# Patient Record
Sex: FEMALE | Race: ASIAN | NOT HISPANIC OR LATINO | Employment: STUDENT | URBAN - METROPOLITAN AREA
[De-identification: names, ages, dates, MRNs, and addresses within clinical notes are randomized per-mention and may not be internally consistent; named-entity substitution may affect disease eponyms.]

---
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
## 2021-03-03 ENCOUNTER — COMMUNICATION - HEALTHEAST (OUTPATIENT)
Dept: FAMILY MEDICINE | Facility: CLINIC | Age: 21
End: 2021-03-03

## 2021-03-03 DIAGNOSIS — N92.1 MENORRHAGIA WITH IRREGULAR CYCLE: ICD-10-CM

## 2021-04-23 ENCOUNTER — OFFICE VISIT - HEALTHEAST (OUTPATIENT)
Dept: FAMILY MEDICINE | Facility: CLINIC | Age: 21
End: 2021-04-23

## 2021-04-23 DIAGNOSIS — N92.1 MENORRHAGIA WITH IRREGULAR CYCLE: ICD-10-CM

## 2021-04-23 DIAGNOSIS — D50.0 IRON DEFICIENCY ANEMIA DUE TO CHRONIC BLOOD LOSS: ICD-10-CM

## 2021-04-23 DIAGNOSIS — Z23 NEED FOR VACCINATION: ICD-10-CM

## 2021-04-23 DIAGNOSIS — Z30.09 ENCOUNTER FOR COUNSELING REGARDING CONTRACEPTION: ICD-10-CM

## 2021-04-23 LAB
ERYTHROCYTE [DISTWIDTH] IN BLOOD BY AUTOMATED COUNT: 11.9 % (ref 11–14.5)
FERRITIN SERPL-MCNC: 36 NG/ML (ref 10–130)
HCT VFR BLD AUTO: 39.8 % (ref 35–47)
HGB BLD-MCNC: 12.4 G/DL (ref 12–16)
HIV 1+2 AB+HIV1 P24 AG SERPL QL IA: NEGATIVE
MCH RBC QN AUTO: 27.4 PG (ref 27–34)
MCHC RBC AUTO-ENTMCNC: 31.2 G/DL (ref 32–36)
MCV RBC AUTO: 88 FL (ref 80–100)
PLATELET # BLD AUTO: 228 THOU/UL (ref 140–440)
PMV BLD AUTO: 11.5 FL (ref 8.5–12.5)
RBC # BLD AUTO: 4.53 MILL/UL (ref 3.8–5.4)
WBC: 6.7 THOU/UL (ref 4–11)

## 2021-04-23 RX ORDER — NORGESTIMATE AND ETHINYL ESTRADIOL 0.25-0.035
1 KIT ORAL DAILY
Qty: 3 PACKAGE | Refills: 4 | Status: SHIPPED | OUTPATIENT
Start: 2021-04-23 | End: 2022-07-25

## 2021-04-26 LAB — HCV AB SERPL QL IA: NEGATIVE

## 2021-04-27 LAB
C TRACH DNA SPEC QL PROBE+SIG AMP: NEGATIVE
N GONORRHOEA DNA SPEC QL NAA+PROBE: NEGATIVE

## 2021-05-26 ENCOUNTER — COMMUNICATION - HEALTHEAST (OUTPATIENT)
Dept: FAMILY MEDICINE | Facility: CLINIC | Age: 21
End: 2021-05-26

## 2021-05-26 DIAGNOSIS — N92.1 MENORRHAGIA WITH IRREGULAR CYCLE: ICD-10-CM

## 2021-05-26 ASSESSMENT — PATIENT HEALTH QUESTIONNAIRE - PHQ9: SUM OF ALL RESPONSES TO PHQ QUESTIONS 1-9: 2

## 2021-05-27 NOTE — PROGRESS NOTES
Assessment/plan   Gualberto Murillo is a 19 y.o. female who is new to my practice.    Gualberto was seen today for menstrual problem.    Diagnoses and all orders for this visit:    Menorrhagia (heavy menstrual bleeding) with regular cycle. Heavy bleeding for 2 days each cycle, leading to presumed iron deficiency anemia based on labs from 2016. Declines  exam today, making full assessment challenging.   Fairly regular cycle each month making anovulatory bleeding less likely cause for her heavy menses. No excessive pain/dysmenorrhea.     Ddx for her abnormal uterine bleeding would include polyp, leiomyoma, adenomyoma, thyroid disorder, bleeding disorder. Unlikely to be infection/cervicitis given no h/o sexual contacts. Less likely malignancy/hyperplasia given age.     Given concern for mucocutaneous bleeding with unknown family hx, will start with lab evaluation as follows. Consideration for pelvic ultrasound; family defers at this time. We will start with stronger NSAID, and if improvement in reduction in blood flow after 1-2 cycles, she will likely continue with this. Otherwise, I did review options for hormonal control of her heavy bleeding. Reviewed range of contraceptive options (though not yet sexually active), from OCPs, the patch, Depo-provera, to hormonal vs nonhormonal IUD and nexplanon, as well as various options for natural family planning or surgical. Reviewed typical effectiveness of each as well as side effect profiles of these options, including effects on spotting, nausea/ headaches, and emotional lability. Pt was given materials to read and will discuss further with her mother.   -     naproxen (NAPROSYN) 500 MG tablet; Take 1 tablet (500 mg total) by mouth 2 (two) times a day with meals.  -     HM1(CBC and Differential)  -     TSH  -     Iron and Transferrin Iron Binding Capacity  -     Ferritin  -     APTT(PTT)  -     INR  -     HM1 (CBC with Diff)  -     Von Willebrand Factor Activity (VWF:Act)  -      Von Willebrand Antigen  -     Factor 8 Assay  Addendum: see result note, labs c/w moderate iron deficiency anemia; recommend replacement with PO iron and recheck CBC  & ferritin in 3 months.      Epistaxis, easy bruising, and uterine bleeding. Given concern for mucocutaneous bleeding with unknown family hx, will start with lab evaluation as follows:  -     APTT(PTT)  -     INR  -     Von Willebrand Factor Activity (VWF:Act)  -     Von Willebrand Antigen  -     Factor 8 Assay    Addendum: 4/9/2019 See result note- indicates inconclusive Von Willibrand factor assay because internal APTT was prolonged (our separate APTT test was at upper limits of normal). Recommendation per that report is to check Factors 9, 11, 12 and lupus anticoagulant. Will place future orders for these as well as repeat APTT to assess further. Consider repeat VWF assay in 1st three days of menses as estrogen can normalize results if still concerned.       Chronic fatigue. Highest suspicion is for fatigue s/t iron deficiency anemia. Will additionally screen thyroid; and vitD at pt's mother's request. Did review data on VitD levels/supplementation has been back and forth without clear evidence to recommend routine screening.   -     Vitamin D, Total (25-Hydroxy)  -     Thyroid Stimulating Hormone (TSH)    Follow up: 1 month with exam/ultrasound if sx not improving    Diane Farfan MD    Subjective:      HPI: Gualberto Murillo is a 19 y.o. female who is here for:    Chief Complaint   Patient presents with     Menstrual Problem     heavy periods, hx of anemia       Heavy periods, h/o anemia:    Periods began age 14. Always pretty heavy. Menses are monthly q25-30 days.   Patient's last menstrual period was 03/25/2019 (within days).  Cramping worst leading up to Day 1, starts with light to moderate blood flow.  Days 2 and 3 are the heaviest, requiring frequent pad changing, passing clots. Switched from super johnny tampons and pads  to DIVA cups  which should last 12 hours but empties this every two hours and still using a pad.   Days 4-5 are still bleeding but light flow, and days 6-7 old blood.   Does try to use ibuprofen 400mg q 6 hours on days 1-3.     Interferes with her daily life.  Denies intermenstrual bleeding.   Feels fatigued, but no LH/dizzines  In 2016 was told she may have anemia, but didn't start iron supplement.   She has never been sexually active.     When asked further:  She does get frequent nose bleeds. Has had 5-10 nose bleeds since January (past 3 months). Bilateral sides affected at random times.   Bruises easily she feels.   Easy gum bleeding during flossing once weekly- not sure if it's due to infrequent flossing or not.  No known famhx of clotting disorder.     Review of Systems:  12 point comprehensive review of systems was negative except as noted and HPI     Social History:  Social History     Social History Narrative     mom- Nenita keller-Tee     brother-Gaurav       Medical History:  Patient Active Problem List   Diagnosis     Post Varicella     Fever     Anxiety     Past Medical History:   Diagnosis Date     Post Varicella     Created by Conversion      No past surgical history on file.  Patient has no known allergies.  No family history on file.    Medications:  Current Outpatient Medications   Medication Sig Dispense Refill     ibuprofen (ADVIL,MOTRIN) 100 MG tablet Take 100 mg by mouth every 6 (six) hours as needed for fever.       naproxen (NAPROSYN) 500 MG tablet Take 1 tablet (500 mg total) by mouth 2 (two) times a day with meals. 60 tablet 2     No current facility-administered medications for this visit.        Imaging & Labs reviewed this visit: Ferritin of 8 6/2018, with Hb 11.4 and MCV 77, normal platelets.       Objective:      Vitals:    04/01/19 1534   BP: 120/60   Pulse: 72   Weight: 114 lb 11.2 oz (52 kg)       Physical Exam:     General: Alert, no acute distress.   HEENT: normocephalic conjunctivae are clear.  Nares are without increased vascularity or abrasions.   Neck: supple without adenopathy or thyromegaly.  Lungs: Good aeration bilaterally. Clear to auscultation without wheezes, rales or rhonci.   Heart: regular rate and rhythm, normal S1 and S2, no murmurs  Abdomen: soft and nontender  Skin: clear without rash or lesions  : pt declined today  Neuro: alert, interactive moving all extremities equally      Diane Farfan MD

## 2021-05-28 ASSESSMENT — ANXIETY QUESTIONNAIRES: GAD7 TOTAL SCORE: 5

## 2021-06-01 VITALS — WEIGHT: 112.1 LBS | HEIGHT: 64 IN | BODY MASS INDEX: 19.14 KG/M2

## 2021-06-02 VITALS — BODY MASS INDEX: 19.84 KG/M2 | WEIGHT: 114.7 LBS

## 2021-06-02 NOTE — PROGRESS NOTES
Assessment/plan   Gualberto Murillo is a 19 y.o. female who is established to my practice.    Gualberto was seen today for menstrual problem.    Diagnoses and all orders for this visit:      Menorrhagia with irregular cycle  We reviewed her menorrhagia extensively back in April, see that note for details. She did notice improvement in her blood flow with NSAID use but this was challenging to time prior to menstrual onset. She is now interested in an OCP to help manage this as well as for birth control now that she is sexually active.  We did a few STD test today as well including GC and wet prep.  She is agreeable to lab recheck for her iron and hemoglobin level.      She is going to wait for further investigation of her mucocutaneous bleeding (see last note) for a while to see if symptoms improve with OCP use.    - norethindrone-ethinyl estradiol (MICROGESTIN FE 1/20) 1 mg-20 mcg (21)/75 mg (7) per tablet; Take 1 tablet by mouth daily.  Dispense: 3 Package; Refill: 3  - Pregnancy (Beta-hCG, Qual), Urine; Future      Encounter for birth control counseling  Reviewed range of contraceptive options, from OCPs, the patch, Depo-provera, to hormonal vs nonhormonal IUD and nexplanon, as well as various options for natural family planning or surgical. Reviewed typical effectiveness of each as well as side effect profiles of these options, including effects on spotting, nausea/ headaches, and emotional lability. Pt elects for oral contraceptives (estrogen/progesterone). Good candidate for this, with no h/o liver or clotting or kidney issues. Nonsmoker. Reviewed how to start this medication.  - norethindrone-ethinyl estradiol (MICROGESTIN FE 1/20) 1 mg-20 mcg (21)/75 mg (7) per tablet; Take 1 tablet by mouth daily.  Dispense: 3 Package; Refill: 3  - Pregnancy (Beta-hCG, Qual), Urine; Future    Iron deficiency anemia due to chronic blood loss  S/p 3 months of iron supplementation three times a day. Will recheck labs today.   -  Ferritin  - HM2(CBC w/o Differential)    Dermatitis  She has a 2 cm flaky white eczematous appearing patch on the anterior aspect of her labia near the clitoris.  She has previously used hydrocortisone and Vaseline with some improvement.  It sometimes fissures and cracks.  There is a small fissure in this today.  I recommended clobetasol ointment and if no symptom resolution in 2 weeks to consider dermatologic referral for biopsy.  I suspect this may be lichen sclerosis.  - clobetasol (TEMOVATE) 0.05 % cream; Apply twice daily to affected area for 2 weeks.  Dispense: 30 g; Refill: 0    Vaginal pain  She is describing some vaginal discomfort with deep penetration, however speculum exam today was normal as was deep bimanual examination.  I suspect there may be some anxiety component to this pain that she reports and possibly not enough lubrication, as with my exam a lot of lubrication and a small speculum was used without any discomfort.  I did encourage her to discuss her sexual activity with her parents.   - Chlamydia trachomatis & Neisseria gonorrhoeae, Amplified Detection  - Wet Prep, Vaginal     At this time she and her mother want to talk about HPV vaccination so she declines this today.      Follow up:  3 months, declines pelvic ultrasound at this time    Diane Farfan MD    Subjective:      HPI: Gualberto Murillo is a 19 y.o. female who is here for:    Chief Complaint   Patient presents with     Establish Care     Menstrual Problem     heavy, inconsistent to be taking naproxen 2 days before, discuss sexual health concerns, would like to start OCP       F/u menorrhagia: She saw me back in April to establish care and we reviewed her menorrhagia history in detail.  She is now interested in an OCP to help manage the symptoms as she is been taking naproxen but her cycles were too inconsistent and couldn't figure out how to time the NSAID very well. When taken a few days before her cycle, she did notice decrease  in her bleeding but cramping the same.     She has been taking iron supplement three times daily. Some constipation with this but not terrible.     She is sexually active now for the past few months.  Her parents are not aware of this.    She states she has some pain with deep insertion during intercourse.  She also has some spotting after intercourse sometimes.  This discomfort in the spotting is not with every occasion.  She has had 2 partners thus far in the past few months.    She went to a campus clinic to discuss fatigue, rechecked her VitD and hemoglobin by her account but she is not aware of these results.    Itchy dry patch located at her perineum near cliterus- spreads sometimes more laterally. Uses hydrocortisone and vaseline which helps but never fully heals. Has had this for a long time- 8 to 9 months, onset occurred before her sexual activity.        To review her menorrhagia:  Periods began age 14. Always pretty heavy. Menses are monthly q25-30 days.   Patient's last menstrual period was 10/08/2019 (within days).  Cramping worst leading up to Day 1, starts with light to moderate blood flow.  Days 2 and 3 are the heaviest, requiring frequent pad changing, passing clots. Switched from super johnny tampons and pads  to DIVA cups which should last 12 hours but empties this every two hours and still using a pad.   Days 4-5 are still bleeding but light flow, and days 6-7 old blood.   Does try to use ibuprofen 400mg q 6 hours on days 1-3.     Interferes with her daily life.  Denies intermenstrual bleeding.   Feels fatigued, but no LH/dizzines  In 2016 was told she may have anemia, but didn't start iron supplement.   She has never been sexually active.     When asked further:  She does get frequent nose bleeds. Has had 5-10 nose bleeds since January (past 3 months). Bilateral sides affected at random times.   Bruises easily she feels.   Easy gum bleeding during flossing once weekly- not sure if it's due to  infrequent flossing or not.  No known famhx of clotting or bleeding disorder.     Review of Systems:  12 point comprehensive review of systems was negative except as noted and HPI     Social History:  Social History     Patient does not qualify to have social determinant information on file (likely too young).   Social History Narrative     ade- Nenita keller-Tee     brother-Gaurav       Medical History:  Patient Active Problem List   Diagnosis     Post Varicella     Anxiety     Menorrhagia with irregular cycle     Iron deficiency anemia due to chronic blood loss     Past Medical History:   Diagnosis Date     Iron deficiency anemia      Post Varicella     Created by Conversion      History reviewed. No pertinent surgical history.  Patient has no known allergies.  History reviewed. No pertinent family history.    Medications:  Current Outpatient Medications   Medication Sig Dispense Refill     ferrous sulfate 325 (65 FE) MG tablet Take 1 tablet (325 mg total) by mouth 3 (three) times a day with meals. 90 tablet 3     naproxen (NAPROSYN) 500 MG tablet Take 1 tablet (500 mg total) by mouth 2 (two) times a day with meals. 60 tablet 2     clobetasol (TEMOVATE) 0.05 % cream Apply twice daily to affected area for 2 weeks. 30 g 0     norethindrone-ethinyl estradiol (MICROGESTIN FE 1/20) 1 mg-20 mcg (21)/75 mg (7) per tablet Take 1 tablet by mouth daily. 3 Package 3     No current facility-administered medications for this visit.        Imaging & Labs reviewed this visit: Ferritin of 8 6/2018, with Hb 11.4 and MCV 77, normal platelets.     Results for LEIGH TINEO TORRIE (MRN [id number]) as of 10/21/2019 15:24   Ref. Range 4/1/2019 16:23   Iron Latest Ref Range: 42 - 175 ug/dL 52   Vitamin D, Total (25-Hydroxy) Latest Ref Range: 30.0 - 80.0 ng/mL 24.8 (L)   Ferritin Latest Ref Range: 6 - 40 ng/mL 10   Transferrin Latest Ref Range: 212 - 360 mg/dL 348   Transferrin IBC, Calculated Latest Ref Range: 313 - 563 ug/dL 434   Transferrin  "Saturation, Calculated Latest Ref Range: 20 - 50 % 12 (L)   TSH Latest Ref Range: 0.30 - 5.00 uIU/mL 0.69   INR Latest Ref Range: 0.90 - 1.10  1.02   PTT Latest Ref Range: 24 - 37 seconds 35   WBC Latest Ref Range: 4.0 - 11.0 thou/uL 5.1   RBC Latest Ref Range: 3.80 - 5.40 mill/uL 4.31   Hemoglobin Latest Ref Range: 12.0 - 16.0 g/dL 10.9 (L)   Hematocrit Latest Ref Range: 35.0 - 47.0 % 34.1 (L)   MCV Latest Ref Range: 80 - 100 fL 79 (L)   MCH Latest Ref Range: 27.0 - 34.0 pg 25.3 (L)       Objective:      Vitals:    10/21/19 1525   BP: 102/62   Pulse: 68   Weight: 121 lb 14.4 oz (55.3 kg)   Height: 5' 4\" (1.626 m)       Physical Exam:     General: Alert, no acute distress.   HEENT: normocephalic conjunctivae are clear. Nares are without increased vascularity or abrasions.   Neck: supple without adenopathy or thyromegaly.  Lungs: Good aeration bilaterally. Clear to auscultation without wheezes, rales or rhonci.   Heart: regular rate and rhythm, normal S1 and S2, no murmurs  Abdomen: soft and nontender  Skin: 2cm eczematous patch above clitoris on the perineum with cigarette paper flaking appearance and small 5mm superficial cracking/ fissure noted   : Speculum and bimanual exam were tolerated without any discomfort. Normal external genitalia with Normal vulva.  Normal vagina with no polyps or lesions and with physiologic discharge.  Normal cervix with normal mucosa with endocervix visible- slight spotting with collection of G/C probe; and without CMT.  No adnexal masses  Neuro: alert, interactive moving all extremities equally          Diane Farfan MD      "

## 2021-06-03 VITALS
HEART RATE: 68 BPM | WEIGHT: 121.9 LBS | BODY MASS INDEX: 20.81 KG/M2 | DIASTOLIC BLOOD PRESSURE: 62 MMHG | SYSTOLIC BLOOD PRESSURE: 102 MMHG | HEIGHT: 64 IN

## 2021-06-03 NOTE — TELEPHONE ENCOUNTER
Refill Approved    Rx renewed per Medication Renewal Policy. Medication was last renewed on 4/9/19.    Judy Falk, [facility] Triage/Med Refill 11/16/2019     Requested Prescriptions   Pending Prescriptions Disp Refills     ferrous sulfate 325 (65 FE) MG tablet [Pharmacy Med Name: FERROUS SULFATE 325MG (5GR) TABS] 90 tablet 0     Sig: TAKE 1 TABLET(325 MG) BY MOUTH THREE TIMES DAILY WITH MEALS       Iron Supplements Refill Protocol Passed - 11/15/2019  3:31 AM        Passed - PCP or prescribing provider visit in past 12 months       Last office visit with prescriber/PCP: 10/21/2019 Diane Farfan MD OR same dept: 10/21/2019 Diane Farfan MD OR same specialty: 10/21/2019 Diane Farfan MD  Last physical: Visit date not found Last MTM visit: Visit date not found   Next visit within 3 mo: Visit date not found  Next physical within 3 mo: Visit date not found  Prescriber OR PCP: Diane Farfan MD  Last diagnosis associated with med order: 1. Iron deficiency anemia due to chronic blood loss  - ferrous sulfate 325 (65 FE) MG tablet [Pharmacy Med Name: FERROUS SULFATE 325MG (5GR) TABS]; TAKE 1 TABLET(325 MG) BY MOUTH THREE TIMES DAILY WITH MEALS  Dispense: 90 tablet; Refill: 0    If protocol passes may refill for 12 months if within 3 months of last provider visit (or a total of 15 months).             Passed - Hemoglobin or Hematocrit in last 12 months     Hemoglobin   Date Value Ref Range Status   10/21/2019 12.6 12.0 - 16.0 g/dL Final     Hematocrit   Date Value Ref Range Status   10/21/2019 37.9 35.0 - 47.0 % Final

## 2021-06-04 VITALS
WEIGHT: 123.9 LBS | HEIGHT: 64 IN | SYSTOLIC BLOOD PRESSURE: 102 MMHG | HEART RATE: 60 BPM | BODY MASS INDEX: 21.15 KG/M2 | DIASTOLIC BLOOD PRESSURE: 68 MMHG

## 2021-06-05 VITALS
OXYGEN SATURATION: 98 % | HEART RATE: 73 BPM | DIASTOLIC BLOOD PRESSURE: 66 MMHG | BODY MASS INDEX: 21.59 KG/M2 | WEIGHT: 125.8 LBS | SYSTOLIC BLOOD PRESSURE: 104 MMHG

## 2021-06-05 NOTE — PATIENT INSTRUCTIONS - HE
"1. I will talk to the student [facility] about available counseling resources  2. Goal to attend weekly if able  3. Go to the gym twice a week    Sending triamcinolone cream for the eczema.    Give me an update in about three months.   MINDFULNESS    \"Mindfulness is about being fully awake in our lives. It is about perceiving the exquisite vividness of each moment.\"      - Tay Díaz  Mindfulness-Based Stress Reduction (MBSR) is a blend of meditation, body awareness, and yoga:   learning through practice and study how your body handles (and can resolve) stress neurologically.     Mindfulness Resources (all are free):  1. \"Calm\" román- free trial has guided 10min sessions on anxiety, gratitude, sleep, happiness, managing stress, etc.   2. \"Smiling Minds\" román- short guided sessions for children and adults  3. \"Insight Timer\" román- free meditation timer with musical or bell tones, can also stream guided meditations    4. Free 8 week MBSR program online: [email or web address]        "

## 2021-06-05 NOTE — PROGRESS NOTES
Assessment/plan   Gualberto Murillo is a 20 y.o. female who is established to my practice.    Gualberto was seen today for menstrual problem.    Diagnoses and all orders for this visit:    1. Encounter for counseling regarding contraception  Desires to continue OCP for now. Tolerating with possible side effect of anhedonia/lower mood but other factors may be causing this (see next). Her iron deficiency has resolved (recent ferritin is 39, from 8)    2. Situational depression  We reviewed her situational low mood the setting of freshman year of college, transitions, tap ensemble with Lebanese horn, and not as many social engagements.  She at this time does not want to stop her OCP and we talked about therapy, mindfulness and exercise to augment her mood.  She is going to follow-up in a few months with an update in we can consider additional interventions at that point.    3. Eczema, unspecified type  Importance of daily emmollient reviewed. Kenalog Rx to use; reviewed risks including skin thinning/atrophy/hypopigmentation  - triamcinolone (KENALOG) 0.1 % ointment; Apply twice daily sparingly up to 2 weeks at a time to affected areas.  Dispense: 90 g; Refill: 11    Follow up:  3 months    Diane Farfan MD    Subjective:      HPI: Gualberto Murillo is a 20 y.o. female who is here for:    Chief Complaint   Patient presents with     Contraception       F/u menorrhagia: She saw me back in April to establish care and we reviewed her menorrhagia history in detail.  Back in November she started on an OCP because her cycles were too consistent to have NSAIDs be very effective.  She had normal withdrawal bleed in Nov during placebo pills, but in Dec had breakthrough bleeding, ok so far in Jan all three weeks were normal and just started placebo pill yesterday.   Menstrual cycles have been lighter which was the goal- she is happy about this  Fatigue has improved. No LH/dizziness. Has been on iron supplement for 3+ months. Her OCP  has iron in it now.     Review of Systems:  She feels her mood is lower in the past month since being on the OCP. More tearfulness. School is stressful and she wonders if this is causing the mood change, also with it being winter. She has been seeing a therapist in the past, but has recently not been able to go regularily due to school.   She has noticed some patches of skin itching- papules. Has tried vaseline or OTC Hydrocortisone and this helps  The clobetasol has helped the vaginal patch that we evaluated.     12 point comprehensive review of systems was negative except as noted and HPI     Social History:  Social History     Social History Narrative     mom- Nenita keller-Tee     brother-Garuav       Medical History:  Patient Active Problem List   Diagnosis     Post Varicella     Anxiety     Menorrhagia with irregular cycle     Iron deficiency anemia due to chronic blood loss     Past Medical History:   Diagnosis Date     Iron deficiency anemia      Post Varicella     Created by Conversion      No past surgical history on file.  Patient has no known allergies.  No family history on file.    Medications:  Current Outpatient Medications   Medication Sig Dispense Refill     clobetasol (TEMOVATE) 0.05 % cream Apply twice daily to affected area for 2 weeks. 30 g 0     norethindrone-ethinyl estradiol (MICROGESTIN FE 1/20) 1 mg-20 mcg (21)/75 mg (7) per tablet Take 1 tablet by mouth daily. 3 Package 3     naproxen (NAPROSYN) 500 MG tablet Take 1 tablet (500 mg total) by mouth 2 (two) times a day with meals. 60 tablet 2     triamcinolone (KENALOG) 0.1 % ointment Apply twice daily sparingly up to 2 weeks at a time to affected areas. 90 g 11     No current facility-administered medications for this visit.        Imaging & Labs reviewed this visit:   Lab Results   Component Value Date    FERRITIN 39 10/21/2019    Improved from 8.     Lab Results   Component Value Date    WBC 4.5 10/21/2019    HGB 12.6 10/21/2019    HCT 37.9  "10/21/2019    MCV 88 10/21/2019     10/21/2019         Objective:      Vitals:    01/20/20 1607   BP: 102/68   Pulse: 60   Weight: 123 lb 14.4 oz (56.2 kg)   Height: 5' 4\" (1.626 m)       Physical Exam:   General: Alert, no acute distress.   HEENT: normocephalic conjunctivae are clear. EOMI  Neck: supple   Lungs: Normal effort  Heart: regular rate  Abdomen: soft   Skin: small areas of pruritic erythematous tiny papules and vesicles  with exudation and some crusting c/w acute eczematous process- affecting her arms, upper chest, and upper legs  Neuro: alert, oriented  Psych: mood good- 'sometimes feels a little down at school though', affect appropriate, good eye contact, insight and judgment intact, normal speech pattern.      Diane Farfan MD    "

## 2021-06-13 NOTE — TELEPHONE ENCOUNTER
Refill Approved    Rx renewed per Medication Renewal Policy. Medication was last renewed on 6/25/20, last OV 1/20/20.    Elysia Stewart, [facility] Triage/Med Refill 12/13/2020     Requested Prescriptions   Pending Prescriptions Disp Refills     AUROVELA FE 1-20, 28, 1 mg-20 mcg (21)/75 mg (7) per tablet [Pharmacy Med Name: AUROVELA FE 1/20 TABLETS] 84 tablet 0     Sig: TAKE 1 TABLET BY MOUTH DAILY       Oral Contraceptives Protocol Passed - 12/12/2020  1:47 PM        Passed - Visit with PCP or prescribing provider visit in last 12 months      Last office visit with prescriber/PCP: Visit date not found OR same dept: 1/20/2020 Diane Farfan MD OR same specialty: 1/20/2020 Diane Farfan MD  Last physical: Visit date not found Last MTM visit: Visit date not found   Next visit within 3 mo: Visit date not found  Next physical within 3 mo: Visit date not found  Prescriber OR PCP: Mesha Huffman MD  Last diagnosis associated with med order: 1. Menorrhagia with irregular cycle  - AUROVELA FE 1-20, 28, 1 mg-20 mcg (21)/75 mg (7) per tablet [Pharmacy Med Name: AUROVELA FE 1/20 TABLETS]; TAKE 1 TABLET BY MOUTH DAILY  Dispense: 84 tablet; Refill: 0    If protocol passes may refill for 12 months if within 3 months of last provider visit (or a total of 15 months).

## 2021-06-15 NOTE — TELEPHONE ENCOUNTER
Left message to call back for: patient   Information to relay to patient:  Please assist in scheduling with PCP for further refills.

## 2021-06-15 NOTE — TELEPHONE ENCOUNTER
RN cannot approve Refill Request    RN can NOT refill this medication PCP messaged that patient is overdue for Office Visit. Last office visit: Visit date not found Last Physical: Visit date not found Last MTM visit: Visit date not found Last visit same specialty: 1/20/2020 Diane Farfan MD.  Next visit within 3 mo: Visit date not found  Next physical within 3 mo: Visit date not found      Alexandrea JODY Kim, [facility] Triage/Med Refill 3/3/2021    Requested Prescriptions   Pending Prescriptions Disp Refills     AUROVELA FE 1-20, 28, 1 mg-20 mcg (21)/75 mg (7) per tablet [Pharmacy Med Name: AUROVELA FE 1/20 TABLETS] 84 tablet 0     Sig: TAKE 1 TABLET BY MOUTH DAILY       Oral Contraceptives Protocol Failed - 3/3/2021 11:53 AM        Failed - Visit with PCP or prescribing provider visit in last 12 months      Last office visit with prescriber/PCP: Visit date not found OR same dept: Visit date not found OR same specialty: 1/20/2020 Diane Farfan MD  Last physical: Visit date not found Last MTM visit: Visit date not found   Next visit within 3 mo: Visit date not found  Next physical within 3 mo: Visit date not found  Prescriber OR PCP: Mesha Huffman MD  Last diagnosis associated with med order: 1. Menorrhagia with irregular cycle  - AUROVELA FE 1-20, 28, 1 mg-20 mcg (21)/75 mg (7) per tablet [Pharmacy Med Name: AUROVELA FE 1/20 TABLETS]; TAKE 1 TABLET BY MOUTH DAILY  Dispense: 84 tablet; Refill: 0    If protocol passes may refill for 12 months if within 3 months of last provider visit (or a total of 15 months).

## 2021-06-16 PROBLEM — F41.9 ANXIETY: Status: ACTIVE | Noted: 2018-06-26

## 2021-06-16 PROBLEM — N92.1 MENORRHAGIA WITH IRREGULAR CYCLE: Status: ACTIVE | Noted: 2019-10-21

## 2021-06-16 PROBLEM — D50.0 IRON DEFICIENCY ANEMIA DUE TO CHRONIC BLOOD LOSS: Status: ACTIVE | Noted: 2019-10-21

## 2021-06-16 NOTE — PROGRESS NOTES
Assessment/plan   Gualberto Murillo is a 21 y.o. female who is established to my practice.    Gualberto was seen today for medication refill.    Diagnoses and all orders for this visit:    Encounter for counseling regarding contraception  Reviewed range of contraceptive options available. Reviewed typical effectiveness of each as well as side effect profiles of these options, including effects on spotting, nausea/ headaches, and emotional lability as well as risk for DVT/PE.   - Pt elects for condoms, oral contraceptives (estrogen/progesterone). She would like to stop current OCP (20mcg estrogen) and change to alternative OCP to see if we can provide better cycles without breakthrough spotting  - Good candidate for this, with no h/o migraine w/ aura, liver or clotting or kidney issues. Nonsmoker.     -    Change to norgestimate-ethinyl estradioL (SPRINTEC, 28,) 0.25-35 mg-mcg per tablet; Take 1 tablet by mouth daily.  -     Chlamydia trachomatis & Neisseria gonorrhoeae, Amplified Detection  -     HIV Antigen/Antibody Screening Cascade  -     Hepatitis C Antibody (Anti-HCV)    Iron deficiency anemia due to chronic blood loss  Menorrhagia with irregular cycle  Hemoglobin stable at 12.4 and ferritin normal at 36.   -     HM2(CBC w/o Differential)  -     Ferritin    Need for vaccination  -     HPV vaccine 9 valent 3 dose IM; Standing  She is open to this but just had COVID vaccine so will return for HPV series when COVID series is completed.     Follow up: Return in about 9 months (around 1/23/2022) for Annual physical. and first pap at that time      Diane Farfan MD    Subjective:      HPI: Gualberto Murillo is a 21 y.o. female who is here for:    Chief Complaint   Patient presents with     Medication Refill     OCP med refill       Birth control refill: Currently using Aurovelva Fe 1mg-20mcg (21d) and 75mg (7d placebo).  During the summer she missed 2 periods in a row.  Cycles are not as consistent- will sometimes get  early withdraw cycle or breakthrough spotting also. Takes meds regularly within 1-2 hours typically.       Hard to maintain relationships during college with virtual learning    Review of Systems:  12 point comprehensive review of systems was negative except as noted and HPI     Social History:  Social History     Social History Narrative     She is a sophomore in college. Majoring in psychology, going to graduate a semester early.   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  She is in the top [employer]- hoping for [place] tour in 2022.  She currently has a roommate and they get along well.     mom- Nenita dad-Tee     brother-Gaurav       Medical History:  Patient Active Problem List   Diagnosis     Post Varicella     Anxiety     Menorrhagia with irregular cycle     Iron deficiency anemia due to chronic blood loss     Past Medical History:   Diagnosis Date     Iron deficiency anemia      Post Varicella     Created by Conversion      History reviewed. No pertinent surgical history.  Patient has no known allergies.  History reviewed. No pertinent family history.    Medications:  Current Outpatient Medications   Medication Sig Dispense Refill     clobetasol (TEMOVATE) 0.05 % cream Apply twice daily to affected area for 2 weeks. 30 g 0     naproxen (NAPROSYN) 500 MG tablet Take 1 tablet (500 mg total) by mouth 2 (two) times a day with meals. 60 tablet 2     triamcinolone (KENALOG) 0.1 % ointment Apply twice daily sparingly up to 2 weeks at a time to affected areas. 90 g 11     norgestimate-ethinyl estradioL (SPRINTEC, 28,) 0.25-35 mg-mcg per tablet Take 1 tablet by mouth daily. 3 Package 4     No current facility-administered medications for this visit.        Imaging & Labs reviewed this visit: none      Objective:      Vitals:    04/23/21 1642   BP: 104/66   Pulse: 73   SpO2: 98%   Weight: 125 lb 12.8 oz (57.1 kg)       Physical Exam:     General: Alert, no acute distress.   HEENT: normocephalic conjunctivae are clear,    Neck: supple without adenopathy or thyromegaly.  Lungs: Good aeration bilaterally. Clear to auscultation without wheezes, rales or rhonci.   Heart: regular rate and rhythm, normal S1 and S2, no murmurs  Abdomen: soft and nontender  Skin: clear without rash or lesions  Neuro: alert, interactive moving all extremities equally      Diane Farfan MD

## 2021-06-18 NOTE — PROGRESS NOTES
[facility] Well Child Check    ASSESSMENT & PLAN  Gualberto Murillo is a 18  y.o. 5  m.o. who has normal growth and normal development.  She recently has been seen at resiliency counseling for anxiety.  That psychology group called the family letting them know that in order to continue to see her that they need a referral from us because the family has  insurance.  Dad was upset that she needed to be seen for a physical.  Review of her chart shows that she has not had a physical since she was 13.  Mom brings her in today with her only concern being getting that referral for ongoing psychology services.  Review of her chart shows that she was seen with pyelonephritis 2 years ago with a hemoglobin of 10 and it was recommended that she return for follow-up labs of a CBC and a ferritin and they did not return for that.  We will go ahead and obtain those labs today along with her random lipid profile.    Diagnoses and all orders for this visit:    Well adolescent visit  -     HM1(CBC and Differential)  -     Ferritin  -     Lipid Cascade RANDOM  -     HM1 (CBC with Diff)  -     Meningococcal MCV4P  -     Hearing Screening    Anxiety  -     Ambulatory referral to Psychology        Return to clinic in 1 year for a Well Child Check or sooner as needed    IMMUNIZATIONS/LABS  Immunizations were reviewed and orders were placed as appropriate. and I have discussed the risks and benefits of all of the vaccine components with the patient/parents.  All questions have been answered.    REFERRALS  Dental:  The patient has already established care with a dentist.  Other:  Psychology    ANTICIPATORY GUIDANCE  I have reviewed age appropriate anticipatory guidance.    HEALTH HISTORY  Do you have any concerns that you'd like to discuss today?: referral for resilience counseling       Roomed by: Analisa    Accompanied by Mother    Refills needed? No    Do you have any forms that need to be filled out? No        Do you have any  significant health concerns in your family history?: No  No family history on file.  Since your last visit, have there been any major changes in your family, such as a move, job change, separation, divorce, or death in the family?: No  Has a lack of transportation kept you from medical appointments?: No    Home  Who lives in your home?:    Social History     Social History Narrative     mom- Nenita dad-Tee     brother-Gaurav     Do you have any concerns about losing your housing?: No  Is your housing safe and comfortable?: Yes  Do you have any trouble with sleep?:  Yes: takes a long time falling asleep, last couple weeks not sleeping past 7hrs which is abnormal for her.    Education  What school do you child attend?:  [employer]  What grade are you in?:  12th  How do you perform in school (grades, behavior, attention, homework?: good     Eating  Do you eat regular meals including fruits and vegetables?:  yes, sometimes no breakfast  What are you drinking (cow's milk, water, soda, juice, sports drinks, energy drinks, etc)?: water and soda  Have you been worried that you don't have enough food?: No  Do you have concerns about your body or appearance?:  No    Activities  Do you have friends?:  yes  Do you get at least one hour of physical activity per day?:  no  How many hours a day are you in front of a screen other than for schoolwork (computer, TV, phone)?:  6  What do you do for exercise?:  Yoga, running  Do you have interest/participate in community activities/volunteers/school sports?:  yes, marching band, theatre     MENTAL HEALTH SCREENING  No Data Recorded  No Data Recorded    VISION/HEARING  Vision: Not done: Performed elsewhere: eye doctor- contacts  Hearing:  Completed. See Results     Hearing Screening    125Hz 250Hz 500Hz 1000Hz 2000Hz 3000Hz 4000Hz 6000Hz 8000Hz   Right ear:   20 20 20  20 20    Left ear:   20 20 20  20 20        TB Risk Assessment:  The patient and/or parent/guardian answer  "positive to:  patient and/or parent/guardian answer 'no' to all screening TB questions    Dyslipidemia Risk Screening  Have either of your parents or any of your grandparents had a stroke or heart attack before age 55?: No: unknown  Any parents with high cholesterol or currently taking medications to treat?: No: unknown     Dental  When was the last time you saw the dentist?: 0-3 months ago   Parent/Guardian declines the fluoride varnish application today.    Patient Active Problem List   Diagnosis     Post Varicella     Fever       Drugs  Does the patient use tobacco/alcohol/drugs?:  no    Safety  Does the patient have any safety concerns (peer or home)?:  no  Does the patient use safety belts, helmets and other safety equipment?:  yes    Sex  Have you ever had sex?:  No    MEASUREMENTS  Height:  5' 3.75\" (1.619 m)  Weight: 112 lb 1.6 oz (50.8 kg)  BMI: Body mass index is 19.39 kg/(m^2).  Blood Pressure: 112/52  Blood pressure percentiles are 52 % systolic and 10 % diastolic based on NHBPEP's 4th Report. Blood pressure percentile targets: 90: 125/80, 95: 129/84, 99 + 5 mmH/97.    PHYSICAL EXAM  Constitutional: She appears well-developed and well-nourished.   HEENT: Head: Normocephalic.    Right Ear: Tympanic membrane, external ear and canal normal.    Left Ear: Tympanic membrane, external ear and canal normal.    Nose: Nose normal.    Mouth/Throat: Mucous membranes are moist. Oropharynx is clear.    Eyes: Conjunctivae and lids are normal. Pupils are equal, round, and reactive to light.  Neck: Neck supple. No tenderness is present.   Cardiovascular: Normal rate and regular rhythm. No murmur heard.  Pulses: Femoral pulses are 2+ bilaterally.   Pulmonary/Chest: Effort normal and breath sounds normal. There is normal air entry. Breast development is normal.  Rian stage 4  Abdominal: Soft. There is no hepatosplenomegaly. No inguinal hernia.   Musculoskeletal: Normal range of motion. Normal strength and tone. No " abnormalities. Spine is straight. Normal duck walk.  Normal heel to toe walk.   : Normal external female genitalia.  Rian stage 4  Neurological: She is alert. She has normal reflexes. Gait normal.   Psychiatric: She has a normal mood and affect. Her speech is normal and behavior is normal.  Skin: Clear. No rashes.

## 2021-06-25 NOTE — TELEPHONE ENCOUNTER
AUROVELA FE 1-20, 28, 1 mg-20 mcg (21)/75 mg (7) per tablet [[id number]]    Electronically signed by: Diane Farfan MD on 03/04/21 1446 Status: Discontinued   Ordering user: Diane Farfan MD 03/04/21 1446 Authorized by: Diane Farfan MD   Frequency:  03/04/21 - 04/23/21  Released by: Diane Farfan MD 03/04/21 1446   Discontinued by: Diane Farfan MD 04/23/21 9705 [Side effects]   Diagnoses   Menorrhagia with irregular cycle [N92.1]

## 2021-06-28 NOTE — PROGRESS NOTES
Progress Notes by Diane Farfan MD at 10/21/2019  3:20 PM     Author: Diane Farfan MD Service: -- Author Type: Physician    Filed: 10/21/2019  9:22 PM Encounter Date: 10/21/2019 Status: Signed    : Diane Farfan MD (Physician)           Objective:    Physical Exam   Genitourinary:          Genitourinary Comments: Diagram depicts location of the 2cm flaking patch with fissure

## 2021-07-03 NOTE — ADDENDUM NOTE
Addendum Note by Diane Farfan MD at 4/1/2019  3:20 PM     Author: Diane Farfan MD Service: -- Author Type: Physician    Filed: 4/9/2019 11:07 AM Encounter Date: 4/1/2019 Status: Signed    : Diane Farfan MD (Physician)    Addended by: DIANE FARFAN on: 4/9/2019 11:07 AM        Modules accepted: Orders

## 2021-08-22 ENCOUNTER — HEALTH MAINTENANCE LETTER (OUTPATIENT)
Age: 21
End: 2021-08-22

## 2021-10-17 ENCOUNTER — HEALTH MAINTENANCE LETTER (OUTPATIENT)
Age: 21
End: 2021-10-17

## 2021-12-12 ENCOUNTER — TRANSFERRED RECORDS (OUTPATIENT)
Dept: HEALTH INFORMATION MANAGEMENT | Facility: CLINIC | Age: 21
End: 2021-12-12

## 2022-07-24 DIAGNOSIS — N92.1 MENORRHAGIA WITH IRREGULAR CYCLE: ICD-10-CM

## 2022-07-24 DIAGNOSIS — Z30.09 ENCOUNTER FOR COUNSELING REGARDING CONTRACEPTION: ICD-10-CM

## 2022-07-25 RX ORDER — NORGESTIMATE AND ETHINYL ESTRADIOL 0.25-0.035
KIT ORAL
Qty: 140 TABLET | Refills: 0 | Status: SHIPPED | OUTPATIENT
Start: 2022-07-25 | End: 2022-12-22

## 2022-07-25 NOTE — TELEPHONE ENCOUNTER
"Routing refill request to provider for review/approval because:  Patient needs to be seen because it has been more than 1 year since last office visit.    Last Written Prescription Date:  04/23/2021  Last Fill Quantity: 3 packs,  # refills: 4   Last office visit provider:  04/23/2021 with PCP.    Requested Prescriptions   Pending Prescriptions Disp Refills     SPRINTEC 28 0.25-35 MG-MCG tablet [Pharmacy Med Name: SPRINTEC 28 .25-35MG-MCG TAB] 140 tablet 0     Sig: TAKE ONE TABLET BY MOUTH EVERY DAY       Contraceptives Protocol Failed - 7/24/2022  7:46 PM        Failed - Recent (12 mo) or future (30 days) visit within the authorizing provider's specialty     Patient has had an office visit with the authorizing provider or a provider within the authorizing providers department within the previous 12 mos or has a future within next 30 days. See \"Patient Info\" tab in inbasket, or \"Choose Columns\" in Meds & Orders section of the refill encounter.              Passed - Patient is not a current smoker if age is 35 or older        Passed - Medication is active on med list        Passed - No active pregnancy on record        Passed - No positive pregnancy test in past 12 months             Debby Munson 07/25/22 12:22 AM  "

## 2022-10-02 ENCOUNTER — HEALTH MAINTENANCE LETTER (OUTPATIENT)
Age: 22
End: 2022-10-02

## 2022-12-22 DIAGNOSIS — N92.1 MENORRHAGIA WITH IRREGULAR CYCLE: ICD-10-CM

## 2022-12-22 DIAGNOSIS — Z30.09 ENCOUNTER FOR COUNSELING REGARDING CONTRACEPTION: ICD-10-CM

## 2022-12-22 RX ORDER — NORGESTIMATE AND ETHINYL ESTRADIOL 0.25-0.035
KIT ORAL
Qty: 140 TABLET | Refills: 0 | Status: SHIPPED | OUTPATIENT
Start: 2022-12-22 | End: 2023-02-24

## 2022-12-22 NOTE — TELEPHONE ENCOUNTER
Routing refill request to provider for review/approval because:  Patient needs to be seen because it has been more than 1 year since last office visit.    PaletteApp message sent to pt to schedule appt.    Paulina Knox RN

## 2023-02-24 DIAGNOSIS — N92.1 MENORRHAGIA WITH IRREGULAR CYCLE: ICD-10-CM

## 2023-02-24 DIAGNOSIS — Z30.09 ENCOUNTER FOR COUNSELING REGARDING CONTRACEPTION: ICD-10-CM

## 2023-02-24 NOTE — TELEPHONE ENCOUNTER
Routing refill request to provider for review/approval because:  Has upcoming appt with Dr Farfan 5/17      Contraceptives Protocol Failed     Recent (12 mo) or future (30 days) visit within the authorizing provider's specialty           SELINA Arredondo  [facility]

## 2023-02-27 RX ORDER — NORGESTIMATE AND ETHINYL ESTRADIOL 0.25-0.035
1 KIT ORAL DAILY
Qty: 140 TABLET | Refills: 0 | Status: SHIPPED | OUTPATIENT
Start: 2023-02-27 | End: 2023-05-17

## 2023-05-16 ASSESSMENT — ANXIETY QUESTIONNAIRES
7. FEELING AFRAID AS IF SOMETHING AWFUL MIGHT HAPPEN: NOT AT ALL
7. FEELING AFRAID AS IF SOMETHING AWFUL MIGHT HAPPEN: NOT AT ALL
GAD7 TOTAL SCORE: 9
8. IF YOU CHECKED OFF ANY PROBLEMS, HOW DIFFICULT HAVE THESE MADE IT FOR YOU TO DO YOUR WORK, TAKE CARE OF THINGS AT HOME, OR GET ALONG WITH OTHER PEOPLE?: SOMEWHAT DIFFICULT
3. WORRYING TOO MUCH ABOUT DIFFERENT THINGS: MORE THAN HALF THE DAYS
4. TROUBLE RELAXING: NOT AT ALL
GAD7 TOTAL SCORE: 9
1. FEELING NERVOUS, ANXIOUS, OR ON EDGE: NEARLY EVERY DAY
GAD7 TOTAL SCORE: 9
IF YOU CHECKED OFF ANY PROBLEMS ON THIS QUESTIONNAIRE, HOW DIFFICULT HAVE THESE PROBLEMS MADE IT FOR YOU TO DO YOUR WORK, TAKE CARE OF THINGS AT HOME, OR GET ALONG WITH OTHER PEOPLE: SOMEWHAT DIFFICULT
2. NOT BEING ABLE TO STOP OR CONTROL WORRYING: MORE THAN HALF THE DAYS
6. BECOMING EASILY ANNOYED OR IRRITABLE: MORE THAN HALF THE DAYS
5. BEING SO RESTLESS THAT IT IS HARD TO SIT STILL: NOT AT ALL

## 2023-05-17 ENCOUNTER — OFFICE VISIT (OUTPATIENT)
Dept: FAMILY MEDICINE | Facility: CLINIC | Age: 23
End: 2023-05-17
Payer: COMMERCIAL

## 2023-05-17 VITALS
SYSTOLIC BLOOD PRESSURE: 102 MMHG | DIASTOLIC BLOOD PRESSURE: 56 MMHG | OXYGEN SATURATION: 99 % | HEIGHT: 64 IN | WEIGHT: 128.6 LBS | BODY MASS INDEX: 21.95 KG/M2 | HEART RATE: 68 BPM

## 2023-05-17 DIAGNOSIS — Z12.4 CERVICAL CANCER SCREENING: ICD-10-CM

## 2023-05-17 DIAGNOSIS — N92.1 MENORRHAGIA WITH IRREGULAR CYCLE: ICD-10-CM

## 2023-05-17 DIAGNOSIS — Z30.09 ENCOUNTER FOR COUNSELING REGARDING CONTRACEPTION: ICD-10-CM

## 2023-05-17 DIAGNOSIS — J30.81 ALLERGIC RHINITIS DUE TO ANIMAL (CAT) (DOG) HAIR AND DANDER: ICD-10-CM

## 2023-05-17 DIAGNOSIS — R53.83 FATIGUE, UNSPECIFIED TYPE: Primary | ICD-10-CM

## 2023-05-17 DIAGNOSIS — F41.1 GAD (GENERALIZED ANXIETY DISORDER): ICD-10-CM

## 2023-05-17 LAB
ANION GAP SERPL CALCULATED.3IONS-SCNC: 15 MMOL/L (ref 7–15)
BUN SERPL-MCNC: 15.8 MG/DL (ref 6–20)
CALCIUM SERPL-MCNC: 9.7 MG/DL (ref 8.6–10)
CHLORIDE SERPL-SCNC: 105 MMOL/L (ref 98–107)
CREAT SERPL-MCNC: 0.65 MG/DL (ref 0.51–0.95)
DEPRECATED HCO3 PLAS-SCNC: 22 MMOL/L (ref 22–29)
ERYTHROCYTE [DISTWIDTH] IN BLOOD BY AUTOMATED COUNT: 12.3 % (ref 10–15)
FERRITIN SERPL-MCNC: 34 NG/ML (ref 6–175)
GFR SERPL CREATININE-BSD FRML MDRD: >90 ML/MIN/1.73M2
GLUCOSE SERPL-MCNC: 97 MG/DL (ref 70–99)
HBA1C MFR BLD: 5.1 % (ref 0–5.6)
HCT VFR BLD AUTO: 38.1 % (ref 35–47)
HGB BLD-MCNC: 12.5 G/DL (ref 11.7–15.7)
MCH RBC QN AUTO: 28.6 PG (ref 26.5–33)
MCHC RBC AUTO-ENTMCNC: 32.8 G/DL (ref 31.5–36.5)
MCV RBC AUTO: 87 FL (ref 78–100)
PLATELET # BLD AUTO: 234 10E3/UL (ref 150–450)
POTASSIUM SERPL-SCNC: 3.7 MMOL/L (ref 3.4–5.3)
RBC # BLD AUTO: 4.37 10E6/UL (ref 3.8–5.2)
SODIUM SERPL-SCNC: 142 MMOL/L (ref 136–145)
TSH SERPL DL<=0.005 MIU/L-ACNC: 0.82 UIU/ML (ref 0.3–4.2)
WBC # BLD AUTO: 4.7 10E3/UL (ref 4–11)

## 2023-05-17 PROCEDURE — 80048 BASIC METABOLIC PNL TOTAL CA: CPT | Performed by: FAMILY MEDICINE

## 2023-05-17 PROCEDURE — 85027 COMPLETE CBC AUTOMATED: CPT | Performed by: FAMILY MEDICINE

## 2023-05-17 PROCEDURE — 90651 9VHPV VACCINE 2/3 DOSE IM: CPT | Performed by: FAMILY MEDICINE

## 2023-05-17 PROCEDURE — 82728 ASSAY OF FERRITIN: CPT | Performed by: FAMILY MEDICINE

## 2023-05-17 PROCEDURE — 99215 OFFICE O/P EST HI 40 MIN: CPT | Mod: 25 | Performed by: FAMILY MEDICINE

## 2023-05-17 PROCEDURE — 83036 HEMOGLOBIN GLYCOSYLATED A1C: CPT | Performed by: FAMILY MEDICINE

## 2023-05-17 PROCEDURE — 82306 VITAMIN D 25 HYDROXY: CPT | Performed by: FAMILY MEDICINE

## 2023-05-17 PROCEDURE — 84443 ASSAY THYROID STIM HORMONE: CPT | Performed by: FAMILY MEDICINE

## 2023-05-17 PROCEDURE — 36415 COLL VENOUS BLD VENIPUNCTURE: CPT | Performed by: FAMILY MEDICINE

## 2023-05-17 PROCEDURE — 96127 BRIEF EMOTIONAL/BEHAV ASSMT: CPT | Performed by: FAMILY MEDICINE

## 2023-05-17 PROCEDURE — 90471 IMMUNIZATION ADMIN: CPT | Performed by: FAMILY MEDICINE

## 2023-05-17 RX ORDER — HYDROXYZINE HYDROCHLORIDE 10 MG/1
10-30 TABLET, FILM COATED ORAL 3 TIMES DAILY PRN
Qty: 60 TABLET | Refills: 3 | Status: SHIPPED | OUTPATIENT
Start: 2023-05-17

## 2023-05-17 RX ORDER — NORGESTIMATE AND ETHINYL ESTRADIOL 0.25-0.035
1 KIT ORAL DAILY
Qty: 140 TABLET | Refills: 1 | Status: SHIPPED | OUTPATIENT
Start: 2023-05-17 | End: 2024-03-12

## 2023-05-17 ASSESSMENT — ANXIETY QUESTIONNAIRES: GAD7 TOTAL SCORE: 9

## 2023-05-17 NOTE — PATIENT INSTRUCTIONS
"Call your insurance to ask about cost of Nexplanon    Ask insurance: Can I get a routine general health physical? With a pap smear? Otherwise if not; what would that cost me?     Contraception options:  For more good quality evidence based review of your options, consider watching the Dr. Lisa Hutton [employer] videos for more information       MINDFULNESS    \"Mindfulness is about being fully awake in our lives. It is about perceiving the exquisite vividness of each moment.\"      - Tay Díaz  Mindfulness-Based Stress Reduction (MBSR) is a blend of meditation, body awareness, and yoga:   learning through practice and study how your body handles (and can resolve) stress neurologically.     Mindfulness Resources (all are free):  \"Calm\" román- free trial has guided 10min sessions on anxiety, gratitude, sleep, happiness, managing stress, etc.   \"Smiling Minds\" román- short guided sessions for children and adults  \"Curable Pain Relief\" román- for chronic pain  \"Insight Timer\" román- free meditation timer with musical or bell tones, can also stream guided meditations    Free 8 week MBSR program online: [email or web address]/              "

## 2023-05-17 NOTE — PROGRESS NOTES
Assessment & Plan     Encounter for counseling regarding contraception  Menorrhagia with irregular cycle  She is leaning toward nexplanon down the road vs IUD; we talked through those risks/benefits.  - norgestimate-ethinyl estradiol (SPRINTEC 28) 0.25-35 MG-MCG tablet; Take 1 tablet by mouth daily    Cervical cancer screening  Declines for now; will do at future physical     Fatigue, unspecified type  Suspect multifactorial, she has described a 3-month viral illness that was COVID-negative that really seem to bring her down about 2 years ago and since then has felt some chronic fatigue.  We will start with some basic labs today.  Follow-up in a couple months.  At this point low likelihood for sleep apnea but she is going to monitor for any snoring or symptoms if she can set up a camera to catch this   - TSH with free T4 reflex; Future  - CBC with platelets; Future  - Ferritin; Future  - Vitamin D Deficiency; Future  - Hemoglobin A1c; Future  - Basic metabolic panel; Future    CONNIE  Reviewed first line treatments for mood with counseling and/or medication management. Side effect profile of first line selective serotonin reuptake inhibitor class of meds reviewed in detail and elected to start Zoloft. PRN agents for mood reviewed and would like to start hydroxyzine. Mindfulness, healthy eating, and exercise reviewed as other was to manage mood symptoms.  Follow up within 2 months for reassessment.    - hydrOXYzine (ATARAX) 10 MG tablet; Take 1-3 tablets (10-30 mg) by mouth 3 times daily as needed for anxiety (or at bedtime for sleep)  - sertraline (ZOLOFT) 50 MG tablet; Take 1 tablet (50 mg) by mouth daily Start at 1/2 tablet (25mg) daily for 1-2 weeks before increasing to full 50mg tablet.  l    Allergic rhinitis due to animal (cat) (dog) hair and dander  Likely has developed allergies to her pets; encouraged use of flonase/zrytec as needed and exposure avoidance/reduction as able; does also agree to meet with  "allergist    - Adult Allergy/Asthma Referral; Future    Return in about 2 months (around 7/17/2023) for Recheck.    41 minutes spent on the date of the encounter doing chart review, patient visit and documentation.    Diane Farfan MD  [facility] Burciaga is a 23 year old, presenting for the following health issues:  Recheck Medication (Med check)    Hasn't been sexually active in 1.5yr; was on an OCP originally for anemia/menorrhagia  She has forgotten to take her OCP a few days  She is interested in the nexplanon and will check on insurance coverage    She states her insurance doesn't cover preventative; doesn't have her dad's  benefits now.     She has been working with a therapist; hx of anxiety since freshman of college  Noticing more in her body moments of anxiety  She feels she \"zones out\" a few more times than usual  Has avoided medication for now due to schooling demands  Fighting chronic fatigue  Getting 7-10hr of sleep  Trying better sleep hygiene   She has been told she does snore (told this once when traveling with her mom)    She had COVID 2 years ago approx.  Just prior to that she had a 3 month viral process \"that was relentless\" (had negative COVID tests during that time)  She had chest congestion with all that.   No hx of asthma    She has two large dogs- Mastif's (since highschool) and friends with cats. She has had dogs (labs) growing up all the time but has been away from college. She notices sh gets hives now on her face/chest when they lick her now. She does get itchy eyes; no itching of her throat.  No shortness of breath or congestion.           5/17/2023    10:59 AM   Additional Questions   Roomed by Ludy KNUTSON LPN     History of Present Illness       Mental Health Follow-up:  Patient presents to follow-up on Anxiety.    Patient's anxiety since last visit has been:  Medium  The patient is having other symptoms associated with " "anxiety.  Any significant life events: other  Patient is not feeling anxious or having panic attacks.  Patient has no concerns about alcohol or drug use.    Reason for visit:  Checking in on my birth control. Discussing alternatives. Discussing general fatigue and random long-term symptoms I ve been struggling with. Discuss Anxiety Medication.    She eats 2-3 servings of fruits and vegetables daily.She consumes 0 sweetened beverage(s) daily.She exercises with enough effort to increase her heart rate 30 to 60 minutes per day.  She exercises with enough effort to increase her heart rate 4 days per week. She is missing 1 dose(s) of medications per week.  She is not taking prescribed medications regularly due to remembering to take.  Today's CONNIE-7 Score: 9               Review of Systems   Constitutional, HEENT, cardiovascular, pulmonary, gi and gu systems are negative, except as otherwise noted.      Objective    /56 (BP Location: Left arm, Patient Position: Sitting, Cuff Size: Adult Regular)   Pulse 68   Ht 1.622 m (5' 3.86\")   Wt 58.3 kg (128 lb 9.6 oz)   LMP 05/07/2023 (Exact Date)   SpO2 99%   BMI 22.17 kg/m    Body mass index is 22.17 kg/m .  Physical Exam   GENERAL: healthy, alert and no distress  NECK: no adenopathy, no asymmetry, masses, or scars and thyroid normal to palpation  RESP: lungs clear to auscultation - no rales, rhonchi or wheezes  CV: regular rate and rhythm, normal S1 S2, no S3 or S4, no murmur, click or rub, no peripheral edema and peripheral pulses strong  ABDOMEN: soft, nontender, no hepatosplenomegaly, no masses and bowel sounds normal  MS: no gross musculoskeletal defects noted, no edema  Psych: mood is good, affect congruent; normal insight and judgement; no HI/SI; good eye contact. Speech is regular rate and normal tone.                    "

## 2023-05-18 LAB — DEPRECATED CALCIDIOL+CALCIFEROL SERPL-MC: 40 UG/L (ref 20–75)

## 2023-07-24 ASSESSMENT — ANXIETY QUESTIONNAIRES
5. BEING SO RESTLESS THAT IT IS HARD TO SIT STILL: NOT AT ALL
7. FEELING AFRAID AS IF SOMETHING AWFUL MIGHT HAPPEN: NOT AT ALL
6. BECOMING EASILY ANNOYED OR IRRITABLE: SEVERAL DAYS
4. TROUBLE RELAXING: NOT AT ALL
IF YOU CHECKED OFF ANY PROBLEMS ON THIS QUESTIONNAIRE, HOW DIFFICULT HAVE THESE PROBLEMS MADE IT FOR YOU TO DO YOUR WORK, TAKE CARE OF THINGS AT HOME, OR GET ALONG WITH OTHER PEOPLE: SOMEWHAT DIFFICULT
GAD7 TOTAL SCORE: 5
1. FEELING NERVOUS, ANXIOUS, OR ON EDGE: MORE THAN HALF THE DAYS
GAD7 TOTAL SCORE: 5
2. NOT BEING ABLE TO STOP OR CONTROL WORRYING: SEVERAL DAYS
3. WORRYING TOO MUCH ABOUT DIFFERENT THINGS: SEVERAL DAYS

## 2023-07-24 ASSESSMENT — PATIENT HEALTH QUESTIONNAIRE - PHQ9
SUM OF ALL RESPONSES TO PHQ QUESTIONS 1-9: 5
SUM OF ALL RESPONSES TO PHQ QUESTIONS 1-9: 5
10. IF YOU CHECKED OFF ANY PROBLEMS, HOW DIFFICULT HAVE THESE PROBLEMS MADE IT FOR YOU TO DO YOUR WORK, TAKE CARE OF THINGS AT HOME, OR GET ALONG WITH OTHER PEOPLE: SOMEWHAT DIFFICULT

## 2023-07-25 ENCOUNTER — OFFICE VISIT (OUTPATIENT)
Dept: FAMILY MEDICINE | Facility: CLINIC | Age: 23
End: 2023-07-25
Attending: FAMILY MEDICINE
Payer: COMMERCIAL

## 2023-07-25 VITALS
WEIGHT: 124.7 LBS | DIASTOLIC BLOOD PRESSURE: 64 MMHG | HEART RATE: 63 BPM | SYSTOLIC BLOOD PRESSURE: 104 MMHG | OXYGEN SATURATION: 98 % | BODY MASS INDEX: 21.5 KG/M2

## 2023-07-25 DIAGNOSIS — Z12.4 SCREENING FOR CERVICAL CANCER: ICD-10-CM

## 2023-07-25 DIAGNOSIS — Z11.3 SCREENING FOR STDS (SEXUALLY TRANSMITTED DISEASES): ICD-10-CM

## 2023-07-25 DIAGNOSIS — F41.1 GAD (GENERALIZED ANXIETY DISORDER): Primary | ICD-10-CM

## 2023-07-25 PROCEDURE — G0145 SCR C/V CYTO,THINLAYER,RESCR: HCPCS | Performed by: FAMILY MEDICINE

## 2023-07-25 PROCEDURE — 99214 OFFICE O/P EST MOD 30 MIN: CPT | Performed by: FAMILY MEDICINE

## 2023-07-25 RX ORDER — CITALOPRAM HYDROBROMIDE 10 MG/1
10 TABLET ORAL DAILY
Qty: 90 TABLET | Refills: 3 | Status: SHIPPED | OUTPATIENT
Start: 2023-07-25

## 2023-07-25 NOTE — PROGRESS NOTES
Assessment & Plan     CONNIE (generalized anxiety disorder)  Side effects to low dose zoloft (insomnia); would like to trial new selective serotonin reuptake inhibitor. Reviewed side effect to Celexa or Prozac (latter which could be  more activating/stimulating) as both these have most evidence should she eventually desire pregnancy. She would like to consider Celexa. Reviewed how to start with 1/2 tablets and titrate up to 10mg after a couple weeks; can stay on this dose for 4-6 weeks before going to 15mg.   - citalopram (CELEXA) 10 MG tablet; Take 1 tablet (10 mg) by mouth daily    Screening for cervical cancer  Collected today  - Pap screen only - recommended age 21 - 24 years    Screening for STDs (sexually transmitted diseases)  Declines GC; done since having new partner                 Diane Farfan MD  [facility] PK Burciaga is a 23 year old, presenting for the following health issues:  Recheck Medication (Was started on zoloft which she has stopped taking due to it disrupting her sleep. Would like to discuss changing to something else. Possible pap today?)        7/25/2023     1:56 PM   Additional Questions   Roomed by Ludy KNUTSON LPN     History of Present Illness       Mental Health Follow-up:  Patient presents to follow-up on Anxiety.    Patient's anxiety since last visit has been:  No change  The patient is not having other symptoms associated with anxiety.  Any significant life events: No  Patient is feeling anxious or having panic attacks.  Patient has no concerns about alcohol or drug use.    She eats 2-3 servings of fruits and vegetables daily.She consumes 0 sweetened beverage(s) daily.She exercises with enough effort to increase her heart rate 30 to 60 minutes per day.  She exercises with enough effort to increase her heart rate 4 days per week.   She is taking medications regularly.       Anxiety: She had really poor sleep on Zoloft  25mg x 2 weeks  (tried AM/PM dosing and no change) and also got some nightmares so she tried the 50mg dose thinking it might have just been her anxiety   Tried the hydroxyzine  10mgonce before bedtime and pre hosting an event and no major change.     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 She currently has a roommate and they get along well.   mom-  Nenita dad-Tee   brother-Gaurav    No smoking or alcohol use of significance    Diane Farfan MD               Review of Systems         Objective    /64 (BP Location: Left arm, Patient Position: Sitting, Cuff Size: Adult Regular)   Pulse 63   Wt 56.6 kg (124 lb 11.2 oz)   LMP 07/04/2023   SpO2 98%   BMI 21.50 kg/m    Body mass index is 21.5 kg/m .  Physical Exam   GENERAL: healthy, alert and no distress  NECK: no adenopathy, no asymmetry, masses, or scars and thyroid normal to palpation  RESP: lungs clear to auscultation - no rales, rhonchi or wheezes  CV: regular rate and rhythm, normal S1 S2, no S3 or S4, no murmur, click or rub, no peripheral edema and peripheral pulses strong  ABDOMEN: soft, nontender and no organomegaly or masses   (female): normal female external genitalia, normal urethral meatus, vaginal mucosa, normal cervix/adnexa/uterus without masses or discharge  MS: no gross musculoskeletal defects noted, no edema

## 2023-07-28 LAB
BKR LAB AP GYN ADEQUACY: NORMAL
BKR LAB AP GYN INTERPRETATION: NORMAL
BKR LAB AP HPV REFLEX: NO
BKR LAB AP LMP: NORMAL
BKR LAB AP PREVIOUS ABNORMAL: NORMAL
PATH REPORT.COMMENTS IMP SPEC: NORMAL
PATH REPORT.COMMENTS IMP SPEC: NORMAL
PATH REPORT.RELEVANT HX SPEC: NORMAL

## 2023-10-21 ENCOUNTER — HEALTH MAINTENANCE LETTER (OUTPATIENT)
Age: 23
End: 2023-10-21

## 2024-03-11 DIAGNOSIS — Z30.09 ENCOUNTER FOR COUNSELING REGARDING CONTRACEPTION: ICD-10-CM

## 2024-03-11 DIAGNOSIS — N92.1 MENORRHAGIA WITH IRREGULAR CYCLE: ICD-10-CM

## 2024-03-12 RX ORDER — NORGESTIMATE AND ETHINYL ESTRADIOL 0.25-0.035
1 KIT ORAL DAILY
Qty: 84 TABLET | Refills: 0 | Status: SHIPPED | OUTPATIENT
Start: 2024-03-12 | End: 2024-06-12

## 2024-06-12 DIAGNOSIS — Z30.09 ENCOUNTER FOR COUNSELING REGARDING CONTRACEPTION: ICD-10-CM

## 2024-06-12 DIAGNOSIS — N92.1 MENORRHAGIA WITH IRREGULAR CYCLE: ICD-10-CM

## 2024-06-13 RX ORDER — NORGESTIMATE AND ETHINYL ESTRADIOL 0.25-0.035
1 KIT ORAL DAILY
Qty: 84 TABLET | Refills: 0 | Status: SHIPPED | OUTPATIENT
Start: 2024-06-13

## 2024-12-14 ENCOUNTER — HEALTH MAINTENANCE LETTER (OUTPATIENT)
Age: 24
End: 2024-12-14